# Patient Record
Sex: FEMALE | Race: WHITE | Employment: FULL TIME | URBAN - METROPOLITAN AREA
[De-identification: names, ages, dates, MRNs, and addresses within clinical notes are randomized per-mention and may not be internally consistent; named-entity substitution may affect disease eponyms.]

---

## 2019-04-30 ENCOUNTER — OFFICE VISIT (OUTPATIENT)
Dept: FAMILY MEDICINE CLINIC | Age: 29
End: 2019-04-30

## 2019-04-30 VITALS
RESPIRATION RATE: 16 BRPM | WEIGHT: 142.8 LBS | HEART RATE: 88 BPM | OXYGEN SATURATION: 99 % | SYSTOLIC BLOOD PRESSURE: 126 MMHG | BODY MASS INDEX: 26.28 KG/M2 | DIASTOLIC BLOOD PRESSURE: 80 MMHG | TEMPERATURE: 98.4 F | HEIGHT: 62 IN

## 2019-04-30 DIAGNOSIS — F41.9 ANXIETY: ICD-10-CM

## 2019-04-30 DIAGNOSIS — R53.83 FATIGUE, UNSPECIFIED TYPE: ICD-10-CM

## 2019-04-30 DIAGNOSIS — E66.3 OVERWEIGHT (BMI 25.0-29.9): ICD-10-CM

## 2019-04-30 DIAGNOSIS — F33.2 SEVERE EPISODE OF RECURRENT MAJOR DEPRESSIVE DISORDER, WITHOUT PSYCHOTIC FEATURES (HCC): Primary | ICD-10-CM

## 2019-04-30 DIAGNOSIS — G47.00 INSOMNIA, UNSPECIFIED TYPE: ICD-10-CM

## 2019-04-30 RX ORDER — BUPROPION HYDROCHLORIDE 75 MG/1
TABLET ORAL
Qty: 60 TAB | Refills: 1 | Status: SHIPPED | OUTPATIENT
Start: 2019-04-30 | End: 2019-05-13 | Stop reason: DRUGHIGH

## 2019-04-30 RX ORDER — MELATONIN 10 MG
CAPSULE ORAL
COMMUNITY

## 2019-04-30 RX ORDER — MELATONIN 5 MG
5 CAPSULE ORAL
COMMUNITY
End: 2019-04-30

## 2019-04-30 NOTE — PATIENT INSTRUCTIONS
FOR DEPRESSION AND ANXIETY: 
START TAKING WELLBUTRIN 75 MG TWICE A DAY, LAST DOSE BEFORE 2PM, IF NO HELP WITH DEPRESSION, LOW ENERGY, AND CONCENTRATION AFTER 1 WEEK CAN INCREASE TO 2 TABS (150 MG) TWICE A DAY 
TRY TO EAT THREE HEALTHY MEALS A DAY 
TRY TO EXERCISE 30 MINUTES A DAY PER BELOW 
TRY TO INCREASE WATER TO 2 LITERS PER DAY We are sending a referral to [address] . You should be called about this in 3-4 weeks. If no word in 4 weeks please give us a call to follow up GET BLOOD WORK FROM [facility] OR [facility] RETURN IN 3 WEEKS If you are having thoughts of harming yourself or others please report to local hospital for evaluation or call suicide hotline [phone] FOR WEIGHT: 
Continue to work on low sugar/carbohydrate diet for weight loss Exercise 30 minutes a day 4-5 days week Learning About Low-Carbohydrate Diets for Weight Loss What is a low-carbohydrate diet? Low-carb diets avoid foods that are high in carbohydrate. These high-carb foods include pasta, bread, rice, cereal, fruits, and starchy vegetables. Instead, these diets usually have you eat foods that are high in fat and protein. Many people lose weight quickly on a low-carb diet. But the early weight loss is water. People on this diet often gain the weight back after they start eating carbs again. Not all diet plans are safe or work well. A lot of the evidence shows that low-carb diets aren't healthy. That's because these diets often don't include healthy foods like fruits and vegetables. Losing weight safely means balancing protein, fat, and carbs with every meal and snack. And low-carb diets don't always provide the vitamins, minerals, and fiber you need. If you have a serious medical condition, talk to your doctor before you try any diet. These conditions include kidney disease, heart disease, type 2 diabetes, high cholesterol, and high blood pressure. If you are pregnant, it may not be safe for your baby if you are on a low-carb diet. How can you lose weight safely? You might have heard that a diet plan helped another person lose weight. But that doesn't mean that it will work for you. It is very hard to stay on a diet that includes lots of big changes in your eating habits. If you want to get to a healthy weight and stay there, making healthy lifestyle changes will often work better than dieting. These steps can help. · Make a plan for change. Work with your doctor to create a plan that is right for you. · See a dietitian. He or she can show you how to make healthy changes in your eating habits. · Manage stress. If you have a lot of stress in your life, it can be hard to focus on making healthy changes to your daily habits. · Track your food and activity. You are likely to do better at losing weight if you keep track of what you eat and what you do. Follow-up care is a key part of your treatment and safety. Be sure to make and go to all appointments, and call your doctor if you are having problems. It's also a good idea to know your test results and keep a list of the medicines you take. Where can you learn more? Go to http://litzy-jose.info/. Enter A121 in the search box to learn more about \"Learning About Low-Carbohydrate Diets for Weight Loss. \" Current as of: December 8, 2016 Content Version: 11.3 © 5954-4925 [employer]. Care instructions adapted under license by [employer] (which disclaims liability or warranty for this information). If you have questions about a medical condition or this instruction, always ask your healthcare professional. [address] any warranty or liability for your use of this information. Body Mass Index: Care Instructions Your Care Instructions Body mass index (BMI) can help you see if your weight is raising your risk for health problems. It uses a formula to compare how much you weigh with how tall you are. · A BMI lower than 18.5 is considered underweight. · A BMI between 18.5 and 24.9 is considered healthy. · A BMI between 25 and 29.9 is considered overweight. A BMI of 30 or higher is considered obese. If your BMI is in the normal range, it means that you have a lower risk for weight-related health problems. If your BMI is in the overweight or obese range, you may be at increased risk for weight-related health problems, such as high blood pressure, heart disease, stroke, arthritis or joint pain, and diabetes. If your BMI is in the underweight range, you may be at increased risk for health problems such as fatigue, lower protection (immunity) against illness, muscle loss, bone loss, hair loss, and hormone problems. BMI is just one measure of your risk for weight-related health problems. You may be at higher risk for health problems if you are not active, you eat an unhealthy diet, or you drink too much alcohol or use tobacco products. Follow-up care is a key part of your treatment and safety. Be sure to make and go to all appointments, and call your doctor if you are having problems. It's also a good idea to know your test results and keep a list of the medicines you take. How can you care for yourself at home? · Practice healthy eating habits. This includes eating plenty of fruits, vegetables, whole grains, lean protein, and low-fat dairy. · If your doctor recommends it, get more exercise. Walking is a good choice. Bit by bit, increase the amount you walk every day. Try for at least 30 minutes on most days of the week. · Do not smoke. Smoking can increase your risk for health problems. If you need help quitting, talk to your doctor about stop-smoking programs and medicines. These can increase your chances of quitting for good. · Limit alcohol to 2 drinks a day for men and 1 drink a day for women.  Too much alcohol can cause health problems. If you have a BMI higher than 25 · Your doctor may do other tests to check your risk for weight-related health problems. This may include measuring the distance around your waist. A waist measurement of more than 40 inches in men or 35 inches in women can increase the risk of weight-related health problems. · Talk with your doctor about steps you can take to stay healthy or improve your health. You may need to make lifestyle changes to lose weight and stay healthy, such as changing your diet and getting regular exercise. If you have a BMI lower than 18.5 · Your doctor may do other tests to check your risk for health problems. · Talk with your doctor about steps you can take to stay healthy or improve your health. You may need to make lifestyle changes to gain or maintain weight and stay healthy, such as getting more healthy foods in your diet and doing exercises to build muscle. Where can you learn more? Go to http://litzy-jose.info/. Enter S176 in the search box to learn more about \"Body Mass Index: Care Instructions. \" Current as of: October 13, 2016 Content Version: 11.4 © 7374-4820 [employer]. Care instructions adapted under license by [employer] (which disclaims liability or warranty for this information). If you have questions about a medical condition or this instruction, always ask your healthcare professional. [address] any warranty or liability for your use of this information.

## 2019-04-30 NOTE — PROGRESS NOTES
INTERNAL MEDICINE INITIAL PROVIDER VISIT SUBJECTIVE:  
 
Chief Complaint Patient presents with Dara Velazco Establish Care  Depression  Anxiety HPI: 34 y.o. female with PMHx significant for allergies is here for the above chief complaint(s). Establish Care: last PCP in years. Depression: since teenager, no formal diagnosis. Daughter born 5 years ago and onset of anxiety, mostly related to daughter and fear of something bad happening to her, which has improved over time. Loss of interest which has progressively worsened. For past 2 weeks reports nearly daily overeating, anhedonia, trouble falling asleep and staying asleep with melatonin helps stay asleep, low energy, trouble concentrating and feeling anxious and irritability. Increased irritability and low frustration tolerance. No episode of decreased need for sleep with increased activity. No AVH//PD/SI/HI. Exercise 2-3 x per week. Eating 3 meals and snacking in between. Positive h/o suicidal thoughts 5 years ago, no h/o suicide attempts. Positive guns at home. No recent panic attacks. Support system includes father of child and grandmother. Insomnia: Denies history of snoring, PND, orthopnea, AM headaches, witnessed apneic episodes. Some caffiene intake 1 cup in morning, has decreased TV/Phone/Reading in bed. STOP-BANG: - snoring, + day time fatigue, - observied apneic episodes, - HTN, - BMI >35, - AGE >50, - neck size >17 in male >15 in female, - male gender. Discussed writer leaving practice July 2019. ROS: 12 point complete ROS completed positive per HPI. Current Outpatient Medications Medication Sig  
 melatonin 10 mg cap Take  by mouth. No current facility-administered medications for this visit. Health Maintenance Topic Date Due  
 DTaP/Tdap/Td series (1 - Tdap) 02/26/2013  PAP AKA CERVICAL CYTOLOGY  02/26/2013  Influenza Age 5 to Adult  08/01/2019  Pneumococcal 0-64 years  Aged Out  
 
 
 Medications and Allergies: Reviewed and confirmed in the chart Past Medical Hx: Reviewed and confirmed in the chart Past Medical History:  
Diagnosis Date  Environmental and seasonal allergies  Overweight (BMI 25.0-29. 9) Patient Active Problem List  
Diagnosis Code  Overweight (BMI 25.0-29. 9) E66.3  Environmental and seasonal allergies J30.89  Severe episode of recurrent major depressive disorder, without psychotic features ([facility] [address] F33.2  Anxiety F41.9  Insomnia G47.00 Family Hx, Surgical Hx, Social Hx: Reviewed and updated in EMR OBJECTIVE: 
Vitals:  
 04/30/19 1411 BP: 126/80 Pulse: 88 Resp: 16 Temp: 98.4 °F (36.9 °C) TempSrc: Oral  
SpO2: 99% Weight: 142 lb 12.8 oz (64.8 kg) Height: 5' 2\" (1.575 m) BP Readings from Last 3 Encounters:  
04/30/19 126/80 Wt Readings from Last 3 Encounters:  
04/30/19 142 lb 12.8 oz (64.8 kg) General: Pleasant adult woman in no acute distress HEENT: Head is atraumatic normo-cephalic. Pupils equally round and reactive to light, extraocular muscle intact, conjunctiva clear, non-icterus. External ears and nose wnl. Oral mucosa moist without erythema, ulceration. Dentures upper teeth. Neck: Supple, no LAD, no palpable thyromegaly. CVS:  Heart regular, rate, and rhythm. Audible S1 and S2. No murmurs, rubs or gallops. PULM: Lungs clear to auscultation bilaterally. No wheezes, rales or rhonchi. GI: Positive bowel sounds, soft, nondistended, non-tender. EXT: 2+ dorsalis pedis pulses bilaterally. No pedal edema bilaterally Neuro: Alert and oriented x3. Gait wnl Mental Status Evaluation:  
 
Appearance Young adult woman dressed casually, good GH, good EC Attitude Cooperative, engaged Behavior:  No PMA/R Speech:  Normal spontaneous Mood:  Euthymic with underlying dysphoria Affect:  Congruent and reactive Thought Process:  Linear, goal directed Thought Content:  As per HPI Sensorium:  alert Cognition:  Grossly intact Insight:  fair Judgment: fair Impulse Control:  fair 4/30/2019 PHQ-9: 20, very difficult 4/30/2019 ALVAREZ-7: 15, very difficult Nursing Notes Reviewed ASSESSMENT AND PLAN 
  ICD-10-CM ICD-9-CM 1. Severe episode of recurrent major depressive disorder, without psychotic features ([facility] [address] F33.2 296.33 buPROPion (WELLBUTRIN) 75 mg tablet TSH 3RD GENERATION  
   T4, FREE  
   VITAMIN B12  
   CBC WITH AUTOMATED DIFF  
   REFERRAL TO PSYCHOLOGY HEALTHY DIET AND EXERCISE ENCOURAGED INCREASE WATER INTAKE 
DISCUSSED R/B/SE OF MEDICINE INCLUDING SEIZURE RISK, PT WITH NO H/O SEIZURE CONTINUE USE OF SOCIAL SUPPORTS 
RTC IN 3 WEEKS 2. Overweight (BMI 25.0-29. 9) E66.3 278.02 Diet and exercise plan discussed BMI education provided 3. Anxiety F41.9 300.00 REFERRAL TO PSYCHOLOGY 4. Fatigue, unspecified type R53.83 780.79 VITAMIN B12  
   CBC WITH AUTOMATED DIFF  
   METABOLIC PANEL, COMPREHENSIVE  
   VITAMIN D, 25 HYDROXY 5. Insomnia, unspecified type G47.00 780.52 SLEEP HYGIENE DISCUSSED CONTINUE MELATONIN Orders Placed This Encounter  TSH 3RD GENERATION  
 T4, FREE  
 VITAMIN B12  
 CBC WITH AUTOMATED DIFF  
 METABOLIC PANEL, COMPREHENSIVE  VITAMIN D, 25 HYDROXY  REFERRAL TO PSYCHOLOGY  DISCONTD: melatonin 5 mg cap capsule  melatonin 10 mg cap  buPROPion (WELLBUTRIN) 75 mg tablet No future appointments. AVS printed and provided to patient Assessment and plan above discussed with patient, patient voiced understanding and agreement with plan. More than 50% of this 60 MIN visit was spent face to face counseling the patient about the etiology and treatment options for the above health conditions outlined in assessment and plan Meredith Frank M.D. [address], [address] [phone] Fax - [phone] or [phone]

## 2019-04-30 NOTE — PROGRESS NOTES
Noemi Caballero is a 34 y.o. female presents in office for Chief Complaint Patient presents with [facility]  Depression  Anxiety Health Maintenance Due Topic Date Due  
 DTaP/Tdap/Td series (1 - Tdap) 02/26/2013  PAP AKA CERVICAL CYTOLOGY  02/26/2013 1. Have you been to the ER, urgent care clinic since your last visit? Hospitalized since your last visit? No 
 
2. Have you seen or consulted any other health care providers outside of the [address] [place] [address] since your last visit? Include any pap smears or colon screening. [facility] in 2013  
 
3 most recent [facility] Screens 4/30/2019 Little interest or pleasure in doing things Nearly every day Feeling down, depressed, irritable, or hopeless Several days Total Score PHQ 2 4 Trouble falling or staying asleep, or sleeping too much Nearly every day Feeling tired or having little energy More than half the days Poor appetite, weight loss, or overeating More than half the days Feeling bad about yourself - or that you are a failure or have let yourself or your family down More than half the days Trouble concentrating on things such as school, work, reading, or watching TV Nearly every day Moving or speaking so slowly that other people could have noticed; or the opposite being so fidgety that others notice Not at all Thoughts of being better off dead, or hurting yourself in some way Not at all PHQ 9 Score 16 How difficult have these problems made it for you to do your work, take care of your home and get along with others Very difficult Abuse Screening Questionnaire 4/30/2019 Do you ever feel afraid of your partner? Atul Walker Are you in a relationship with someone who physically or mentally threatens you? Atul Walker Is it safe for you to go home? Chrissy Whitman Fall Risk Assessment, last 12 mths 4/30/2019 Able to walk? Yes Fall in past 12 months? No  
 
Learning Assessment 4/30/2019 PRIMARY LEARNER Patient HIGHEST LEVEL OF EDUCATION - PRIMARY LEARNER  GRADUATED HIGH SCHOOL OR GED  
BARRIERS PRIMARY LEARNER NONE  
CO-LEARNER CAREGIVER No  
PRIMARY LANGUAGE ENGLISH  
LEARNER PREFERENCE PRIMARY DEMONSTRATION  
  READING  
ANSWERED BY patient RELATIONSHIP SELF

## 2019-05-02 ENCOUNTER — TELEPHONE (OUTPATIENT)
Dept: FAMILY MEDICINE CLINIC | Age: 29
End: 2019-05-02

## 2019-05-02 NOTE — TELEPHONE ENCOUNTER
Pt states was rx'd Wellbutrin a couple of days ago.  Pt inquiring can she take this medication with allergy medicaton

## 2019-05-02 NOTE — TELEPHONE ENCOUNTER
LPN please call patient and inform okay to take medicine with general allergy medicine. Patient can ask pharmacist at store where she purchases the allergy medicine if there are any concerns for the specific medicine she plans to purchase. Campbell Merritt M.D.   [place] [phone]  [facility]   [phone]

## 2019-05-03 LAB
25(OH)D3+25(OH)D2 SERPL-MCNC: 28.9 NG/ML (ref 30–100)
ALBUMIN SERPL-MCNC: 4.6 G/DL (ref 3.5–5.5)
ALBUMIN/GLOB SERPL: 1.9 {RATIO} (ref 1.2–2.2)
ALP SERPL-CCNC: 32 IU/L (ref 39–117)
ALT SERPL-CCNC: 16 IU/L (ref 0–32)
AST SERPL-CCNC: 14 IU/L (ref 0–40)
BASOPHILS # BLD AUTO: 0 X10E3/UL (ref 0–0.2)
BASOPHILS NFR BLD AUTO: 0 %
BILIRUB SERPL-MCNC: 0.5 MG/DL (ref 0–1.2)
BUN SERPL-MCNC: 17 MG/DL (ref 6–20)
BUN/CREAT SERPL: 25 (ref 9–23)
CALCIUM SERPL-MCNC: 9.4 MG/DL (ref 8.7–10.2)
CHLORIDE SERPL-SCNC: 101 MMOL/L (ref 96–106)
CO2 SERPL-SCNC: 24 MMOL/L (ref 20–29)
CREAT SERPL-MCNC: 0.69 MG/DL (ref 0.57–1)
EOSINOPHIL # BLD AUTO: 0.1 X10E3/UL (ref 0–0.4)
EOSINOPHIL NFR BLD AUTO: 1 %
ERYTHROCYTE [DISTWIDTH] IN BLOOD BY AUTOMATED COUNT: 13.6 % (ref 12.3–15.4)
GLOBULIN SER CALC-MCNC: 2.4 G/DL (ref 1.5–4.5)
GLUCOSE SERPL-MCNC: 89 MG/DL (ref 65–99)
HCT VFR BLD AUTO: 40.4 % (ref 34–46.6)
HGB BLD-MCNC: 13.1 G/DL (ref 11.1–15.9)
IMM GRANULOCYTES # BLD AUTO: 0 X10E3/UL (ref 0–0.1)
IMM GRANULOCYTES NFR BLD AUTO: 0 %
LYMPHOCYTES # BLD AUTO: 1.7 X10E3/UL (ref 0.7–3.1)
LYMPHOCYTES NFR BLD AUTO: 23 %
MCH RBC QN AUTO: 28.9 PG (ref 26.6–33)
MCHC RBC AUTO-ENTMCNC: 32.4 G/DL (ref 31.5–35.7)
MCV RBC AUTO: 89 FL (ref 79–97)
MONOCYTES # BLD AUTO: 0.6 X10E3/UL (ref 0.1–0.9)
MONOCYTES NFR BLD AUTO: 8 %
NEUTROPHILS # BLD AUTO: 4.8 X10E3/UL (ref 1.4–7)
NEUTROPHILS NFR BLD AUTO: 68 %
PLATELET # BLD AUTO: 319 X10E3/UL (ref 150–379)
POTASSIUM SERPL-SCNC: 4.2 MMOL/L (ref 3.5–5.2)
PROT SERPL-MCNC: 7 G/DL (ref 6–8.5)
RBC # BLD AUTO: 4.53 X10E6/UL (ref 3.77–5.28)
SODIUM SERPL-SCNC: 139 MMOL/L (ref 134–144)
T4 FREE SERPL-MCNC: 1.22 NG/DL (ref 0.82–1.77)
TSH SERPL DL<=0.005 MIU/L-ACNC: 1.2 UIU/ML (ref 0.45–4.5)
VIT B12 SERPL-MCNC: 586 PG/ML (ref 232–1245)
WBC # BLD AUTO: 7.2 X10E3/UL (ref 3.4–10.8)

## 2019-05-03 NOTE — TELEPHONE ENCOUNTER
Contacted patient in regards to taking OTC allergy medicine. Advised patient that general allergy medicine is ok to take and the pharmacist is available for further questions. Patient verbalized understanding.

## 2019-05-10 ENCOUNTER — TELEPHONE (OUTPATIENT)
Dept: FAMILY MEDICINE CLINIC | Age: 29
End: 2019-05-10

## 2019-05-10 DIAGNOSIS — F33.2 SEVERE EPISODE OF RECURRENT MAJOR DEPRESSIVE DISORDER, WITHOUT PSYCHOTIC FEATURES (HCC): Primary | ICD-10-CM

## 2019-05-10 NOTE — TELEPHONE ENCOUNTER
Patient called today stating that the provider told her that if the medication was not working to double the dose of the Wellbutrin. Patient states that she doubled the dose and it has made her feel really weird. She would like to know if there was a dose that is in between so she is not taking 150 mg of the medication.

## 2019-05-13 RX ORDER — BUPROPION HYDROCHLORIDE 100 MG/1
100 TABLET, EXTENDED RELEASE ORAL 2 TIMES DAILY
Qty: 60 TAB | Refills: 2 | Status: SHIPPED | OUTPATIENT
Start: 2019-05-13 | End: 2019-05-21 | Stop reason: SDUPTHER

## 2019-05-13 NOTE — TELEPHONE ENCOUNTER
I spoke with patient, she stated she felt \"confused when she doubled up on her wellbutrin\" She said she felt \"lost\" would like to know if there is a dosage in between.

## 2019-05-13 NOTE — TELEPHONE ENCOUNTER
LPN please call patient and inform can take 100 mg of wellbutrin twice a day, Rx sent to pharmacy on file. Maty Abreu M.D.   [address], [address] [place] [address], [address] [place] [address]   VJU -   [phone]

## 2019-05-21 ENCOUNTER — OFFICE VISIT (OUTPATIENT)
Dept: FAMILY MEDICINE CLINIC | Age: 29
End: 2019-05-21

## 2019-05-21 VITALS
SYSTOLIC BLOOD PRESSURE: 124 MMHG | WEIGHT: 140.4 LBS | HEART RATE: 89 BPM | TEMPERATURE: 96.7 F | HEIGHT: 62 IN | RESPIRATION RATE: 16 BRPM | DIASTOLIC BLOOD PRESSURE: 87 MMHG | BODY MASS INDEX: 25.83 KG/M2

## 2019-05-21 DIAGNOSIS — E55.9 VITAMIN D INSUFFICIENCY: ICD-10-CM

## 2019-05-21 DIAGNOSIS — F33.0 MDD (MAJOR DEPRESSIVE DISORDER), RECURRENT EPISODE, MILD (HCC): Primary | ICD-10-CM

## 2019-05-21 DIAGNOSIS — F41.9 ANXIETY: ICD-10-CM

## 2019-05-21 PROBLEM — F33.2 SEVERE EPISODE OF RECURRENT MAJOR DEPRESSIVE DISORDER, WITHOUT PSYCHOTIC FEATURES (HCC): Status: RESOLVED | Noted: 2019-04-30 | Resolved: 2019-05-21

## 2019-05-21 RX ORDER — BUPROPION HYDROCHLORIDE 100 MG/1
100 TABLET, EXTENDED RELEASE ORAL 2 TIMES DAILY
Qty: 60 TAB | Refills: 5 | Status: SHIPPED | OUTPATIENT
Start: 2019-05-21 | End: 2019-08-21 | Stop reason: SDUPTHER

## 2019-05-21 RX ORDER — ETONOGESTREL AND ETHINYL ESTRADIOL 11.7; 2.7 MG/1; MG/1
INSERT, EXTENDED RELEASE VAGINAL
COMMUNITY

## 2019-05-21 NOTE — PROGRESS NOTES
Internal Medicine Follow Up Visit Note    Chief Complaint   Patient presents with    Depression       HPI:  Whit Mooney is a 34 y.o.  female with history significant for anxiety and depression is here for the above complaint(s). Anxiety and depression: Since last visit mood is \"better. \" Has noticed an improvement \"in general.\" Taking wellbutrin 100 mg BID. Feels medicine is helpful. Reports some headaches intermittently, possibly related to allergies and when taking medicine without eating. Initially was irritable but improved. Eating 3 meals per day, not as much. Appetite and sleep are \"okay. \"  Switched to melatonin which has led to waking up earlier and trouble falling back to sleep. Feels sleep is restful. Exercise includes 3-4 x per week 35 minutes per sessions. Started talk therapy last week, going once a week. Discussed writer leaving practice July 2019. Current Outpatient Medications   Medication Sig    ethinyl estradiol-etonogestrel (NUVARING) 0.12-0.015 mg/24 hr vaginal ring by Intravaginal route.  buPROPion SR (WELLBUTRIN SR) 100 mg SR tablet Take 1 Tab by mouth two (2) times a day for 180 days.  melatonin 10 mg cap Take  by mouth. No current facility-administered medications for this visit. Health Maintenance   Topic Date Due    DTaP/Tdap/Td series (1 - Tdap) 02/26/2013    Influenza Age 5 to Adult  08/01/2019    PAP AKA CERVICAL CYTOLOGY  05/06/2022    Pneumococcal 0-64 years  Aged Out       There is no immunization history on file for this patient. Allergies and Medications: Reviewed and updated in EMR. Patient Active Problem List   Diagnosis Code    Overweight (BMI 25.0-29. 9) E66.3    Environmental and seasonal allergies J30.89    Anxiety F41.9    Insomnia G47.00    Vitamin D insufficiency E55.9    MDD (major depressive disorder), recurrent episode, mild (HCC) F33.0       Family History, Social History, Past Medical History, Surgical History: Reviewed and updated in EMR as appropriate. OBJECTIVE:   Visit Vitals  /87   Pulse 89   Temp 96.7 °F (35.9 °C) (Oral)   Resp 16   Ht 5' 2\" (1.575 m)   Wt 140 lb 6.4 oz (63.7 kg)   LMP 05/05/2019 (Approximate)   BMI 25.68 kg/m²        BP Readings from Last 3 Encounters:   05/21/19 124/87   04/30/19 126/80     Wt Readings from Last 3 Encounters:   05/21/19 140 lb 6.4 oz (63.7 kg)   04/30/19 142 lb 12.8 oz (64.8 kg)       General: Pleasant adult woman in no acute distress  HEENT: Head is atraumatic normo-cephalic. Neuro: Alert and oriented x3. Gait wnl     Mental Status Evaluation:      Appearance Young adult woman dressed casually, good GH, good EC   Attitude Cooperative, engaged   Behavior:  No PMA/R   Speech:  Normal spontaneous   Mood:  Euthymic   Affect:  Congruent and reactive   Thought Process:  Linear, goal directed   Thought Content:  As per HPI   Sensorium:  alert   Cognition:  Grossly intact   Insight:  fair   Judgment: good                                  Impulse Control:  good      4/30/2019 PHQ-9: 20, very difficult  4/30/2019 ALVAREZ-7: 15, very difficult    5/21/2019: PHQ-9: 6, not difficult at all  5/21/2019: ALVAREZ-7: 7, not difficult at all    Nursing Notes Reviewed. LABS/RADIOLOGICAL TESTS:    Lab Results   Component Value Date/Time    WBC 7.2 05/02/2019 09:26 AM    HGB 13.1 05/02/2019 09:26 AM    HCT 40.4 05/02/2019 09:26 AM    PLATELET 110 15/58/6110 09:26 AM     Lab Results   Component Value Date/Time    Sodium 139 05/02/2019 09:26 AM    Potassium 4.2 05/02/2019 09:26 AM    Chloride 101 05/02/2019 09:26 AM    CO2 24 05/02/2019 09:26 AM    Glucose 89 05/02/2019 09:26 AM    BUN 17 05/02/2019 09:26 AM    Creatinine 0.69 05/02/2019 09:26 AM       All lab results and radiological studies were reviewed and discussed with the patient. ASSESSMENT/PLAN:      ICD-10-CM ICD-9-CM    1.  MDD (major depressive disorder), recurrent episode, mild (HCC) F33.0 296.31 buPROPion SR (WELLBUTRIN SR) 100 mg SR tablet bid  Continue therapy  Diet, exercise, self care discussed  RTC in 3 months establish with new PCP   2. Anxiety F41.9 300.00 buPROPion SR (WELLBUTRIN SR) 100 mg SR tablet   3. Vitamin D insufficiency E55.9 268.9 [id number] units daily OTC       Requested Prescriptions     Signed Prescriptions Disp Refills    buPROPion SR (WELLBUTRIN SR) 100 mg SR tablet 60 Tab 5     Sig: Take 1 Tab by mouth two (2) times a day for 180 days. Patient verbalized understanding and agreement with the plan. Patient was given an after-visit summary. Follow-up and Dispositions    · Return in about 3 months (around 8/21/2019) for anxiety and depression. or sooner if worsening symptoms. More than 50% of this 25 min visit was spent counseling the patient face to face about etiology and treatment of health conditions outlined in assessment and plan. Amberly Welch M.D.   [place] [id number] [place] [address]   [phone]

## 2019-05-21 NOTE — PROGRESS NOTES
Chief Complaint   Patient presents with    Depression     1. Have you been to the ER, urgent care clinic since your last visit? Hospitalized since your last visit? No    2. Have you seen or consulted any other health care providers outside of the [address] [place] [address] since your last visit? Include any pap smears or colon screening.  GYN-Dr Jenny Dennis

## 2019-05-21 NOTE — PATIENT INSTRUCTIONS
START TAKING VITAMIN D [id number] UNITS DAILY Recovering From Depression: Care Instructions Your Care Instructions Taking good care of yourself is important as you recover from depression. In time, your symptoms will fade as your treatment takes hold. Do not give up. Instead, focus your energy on getting better. Your mood will improve. It just takes some time. Focus on things that can help you feel better, such as being with friends and family, eating well, and getting enough rest. But take things slowly. Do not do too much too soon. You will begin to feel better gradually. Follow-up care is a key part of your treatment and safety. Be sure to make and go to all appointments, and call your doctor if you are having problems. It's also a good idea to know your test results and keep a list of the medicines you take. How can you care for yourself at home? Be realistic · If you have a large task to do, break it up into smaller steps you can handle, and just do what you can. · You may want to put off important decisions until your depression has lifted. If you have plans that will have a major impact on your life, such as marriage, divorce, or a job change, try to wait a bit. Talk it over with friends and loved ones who can help you look at the overall picture first. 
· Reaching out to people for help is important. Do not isolate yourself. Let your family and friends help you. Find someone you can trust and confide in, and talk to that person. · Be patient, and be kind to yourself. Remember that depression is not your fault and is not something you can overcome with willpower alone. Treatment is necessary for depression, just like for any other illness. Feeling better takes time, and your mood will improve little by little. Stay active · Stay busy and get outside. Take a walk, or try some other light exercise. · Talk with your doctor about an exercise program. Exercise can help with mild depression. · Go to a movie or concert. Take part in a [employer]an activity or other social gathering. Go to a ball game. · Ask a friend to have dinner with you. Take care of yourself · Eat a balanced diet with plenty of fresh fruits and vegetables, whole grains, and lean protein. If you have lost your appetite, eat small snacks rather than large meals. · Avoid drinking alcohol or using illegal drugs. Do not take medicines that have not been prescribed for you. They may interfere with medicines you may be taking for depression, or they may make your depression worse. · Take your medicines exactly as they are prescribed. You may start to feel better within 1 to 3 weeks of taking antidepressant medicine. But it can take as many as 6 to 8 weeks to see more improvement. If you have questions or concerns about your medicines, or if you do not notice any improvement by 3 weeks, talk to your doctor. · If you have any side effects from your medicine, tell your doctor. Antidepressants can make you feel tired, dizzy, or nervous. Some people have dry mouth, constipation, headaches, sexual problems, or diarrhea. Many of these side effects are mild and will go away on their own after you have been taking the medicine for a few weeks. Some may last longer. Talk to your doctor if side effects are bothering you too much. You might be able to try a different medicine. · Get enough sleep. If you have problems sleeping: 
? Go to bed at the same time every night, and get up at the same time every morning. ? Keep your bedroom dark and quiet. ? Do not exercise after 5:00 p.m. ? Avoid drinks with caffeine after 5:00 p.m. · Avoid sleeping pills unless they are prescribed by the doctor treating your depression. Sleeping pills may make you groggy during the day, and they may interact with other medicine you are taking.  
· If you have any other illnesses, such as diabetes, heart disease, or high blood pressure, make sure to continue with your treatment. Tell your doctor about all of the medicines you take, including those with or without a prescription. · Keep the numbers for these national suicide hotlines: 1-380-793-TALK (0-414.743.4127) and 4-866-WAVZJHX (4-752.915.4718). If you or someone you know talks about suicide or feeling hopeless, get help right away. When should you call for help? Call 911 anytime you think you may need emergency care. For example, call if: 
  · You feel like hurting yourself or someone else.  
  · Someone you know has depression and is about to attempt or is attempting suicide.  
[facility] your doctor now or seek immediate medical care if: 
  · You hear voices.  
  · Someone you know has depression and: 
? Starts to give away his or her possessions. ? Uses illegal drugs or drinks alcohol heavily. ? Talks or writes about death, including writing suicide notes or talking about guns, knives, or pills. ? Starts to spend a lot of time alone. ? Acts very aggressively or suddenly appears calm.  
 Watch closely for changes in your health, and be sure to contact your doctor if: 
  · You do not get better as expected. Where can you learn more? Go to http://litzy-jose.info/. Enter D004 in the search box to learn more about \"Recovering From Depression: Care Instructions. \" Current as of: September 11, 2018 Content Version: 11.9 © 8633-7018 [employer]. Care instructions adapted under license by [employer] (which disclaims liability or warranty for this information). If you have questions about a medical condition or this instruction, always ask your healthcare professional. [place] [address] any warranty or liability for your use of this information.

## 2019-08-21 ENCOUNTER — OFFICE VISIT (OUTPATIENT)
Dept: FAMILY MEDICINE CLINIC | Age: 29
End: 2019-08-21

## 2019-08-21 VITALS
OXYGEN SATURATION: 100 % | SYSTOLIC BLOOD PRESSURE: 147 MMHG | TEMPERATURE: 97.1 F | RESPIRATION RATE: 16 BRPM | HEART RATE: 92 BPM | WEIGHT: 140 LBS | DIASTOLIC BLOOD PRESSURE: 97 MMHG | BODY MASS INDEX: 25.76 KG/M2 | HEIGHT: 62 IN

## 2019-08-21 DIAGNOSIS — F41.9 ANXIETY: ICD-10-CM

## 2019-08-21 DIAGNOSIS — F32.A DEPRESSION, UNSPECIFIED DEPRESSION TYPE: ICD-10-CM

## 2019-08-21 DIAGNOSIS — Z76.89 ENCOUNTER TO ESTABLISH CARE: Primary | ICD-10-CM

## 2019-08-21 DIAGNOSIS — R03.0 ELEVATED BLOOD PRESSURE READING: ICD-10-CM

## 2019-08-21 DIAGNOSIS — F33.0 MDD (MAJOR DEPRESSIVE DISORDER), RECURRENT EPISODE, MILD (HCC): ICD-10-CM

## 2019-08-21 RX ORDER — SERTRALINE HYDROCHLORIDE 25 MG/1
25 TABLET, FILM COATED ORAL DAILY
Qty: 30 TAB | Refills: 1 | Status: SHIPPED | OUTPATIENT
Start: 2019-08-21 | End: 2019-10-02 | Stop reason: DRUGHIGH

## 2019-08-21 NOTE — PROGRESS NOTES
ESTABLISH CARE VISIT    SUBJECTIVE:     Chief Complaint   Patient presents with    Establish Care    Depression       HPI: 34 y.o.  female  has a past medical history of Environmental and seasonal allergies and Overweight (BMI 25.0-29.9). is here for the above chief complaint(s). Depression and Anxiety Follow-up:   Patient is seen for depression and anxiety disorder. Current treatment includes Wellbutrin and no other therapies. Ongoing depressive symptoms include depressed mood. Ongoig anxiety symptoms include: none. Patient denies: continues to have ongoing depressed mood   Reported side effects from the treatment: none. BP Readings from Last 3 Encounters:   08/21/19 (!) 147/97   05/21/19 124/87   04/30/19 126/80       Review of Systems   Constitutional: Negative for chills, fever, malaise/fatigue and weight loss. Eyes: Negative for blurred vision, double vision and pain. Respiratory: Negative for cough, sputum production, shortness of breath and wheezing. Cardiovascular: Negative for chest pain, palpitations, orthopnea, claudication and leg swelling. Gastrointestinal: Negative for abdominal pain, constipation, diarrhea, nausea and vomiting. Genitourinary: Negative for dysuria, frequency and urgency. Neurological: Negative for dizziness, tingling and headaches. [unfilled]  Current Outpatient Medications   Medication Sig    ethinyl estradiol-etonogestrel (NUVARING) 0.12-0.015 mg/24 hr vaginal ring by Intravaginal route.  buPROPion SR (WELLBUTRIN SR) 100 mg SR tablet Take 1 Tab by mouth two (2) times a day for 180 days.  melatonin 10 mg cap Take  by mouth. No current facility-administered medications for this visit.       Health Maintenance   Topic Date Due    DTaP/Tdap/Td series (1 - Tdap) 02/26/2013    Influenza Age 5 to Adult  08/01/2019    PAP AKA CERVICAL CYTOLOGY  05/06/2022    Pneumococcal 0-64 years  Aged Out       Medications and Allergies: Reviewed and confirmed in the chart    Past Medical Hx: Reviewed and confirmed in the chart  Past Medical History:   Diagnosis Date    Environmental and seasonal allergies     Overweight (BMI 25.0-29. 9)        Patient Active Problem List   Diagnosis Code    Overweight (BMI 25.0-29. 9) E66.3    Environmental and seasonal allergies J30.89    Anxiety F41.9    Insomnia G47.00    Vitamin D insufficiency E55.9    MDD (major depressive disorder), recurrent episode, mild (HCC) F33.0       Family Hx, Surgical Hx, Social Hx: Reviewed and updated in EMR    OBJECTIVE:   Vitals:    08/21/19 1501   BP: (!) 147/97   Pulse: 92   Resp: 16   Temp: 97.1 °F (36.2 °C)   TempSrc: Oral   SpO2: 100%   Weight: 140 lb (63.5 kg)   Height: 5' 2\" (1.575 m)       BP Readings from Last 3 Encounters:   08/21/19 (!) 147/97   05/21/19 124/87   04/30/19 126/80     Wt Readings from Last 3 Encounters:   08/21/19 140 lb (63.5 kg)   05/21/19 140 lb 6.4 oz (63.7 kg)   04/30/19 142 lb 12.8 oz (64.8 kg)       Physical Exam   Constitutional: She is oriented to person, place, and time and well-developed, well-nourished, and in no distress. No distress. Neck: Normal range of motion. Neck supple. No thyromegaly present. Cardiovascular: Normal rate, regular rhythm, normal heart sounds and intact distal pulses. Exam reveals no gallop and no friction rub. No murmur heard. Pulmonary/Chest: Effort normal and breath sounds normal. No respiratory distress. She has no wheezes. She has no rales. She exhibits no tenderness. Lymphadenopathy:     She has no cervical adenopathy. Neurological: She is alert and oriented to person, place, and time. Skin: Skin is warm and dry. She is not diaphoretic. Psychiatric: Mood, memory, affect and judgment normal.   Nursing note and vitals reviewed.         Lab Results   Component Value Date/Time    WBC 7.2 05/02/2019 09:26 AM    HGB 13.1 05/02/2019 09:26 AM    HCT 40.4 05/02/2019 09:26 AM    PLATELET 091 97/03/1435 09:26 AM Lab Results   Component Value Date/Time    Sodium 139 05/02/2019 09:26 AM    Potassium 4.2 05/02/2019 09:26 AM    Chloride 101 05/02/2019 09:26 AM    CO2 24 05/02/2019 09:26 AM    Glucose 89 05/02/2019 09:26 AM    BUN 17 05/02/2019 09:26 AM    Creatinine 0.69 05/02/2019 09:26 AM     No results found for: CHOL, CHOLX, CHLST, CHOLV, HDL, LDL, LDLC, DLDLP, TGLX, TRIGL, TRIGP  No results found for: GPT    Nursing Notes Reviewed    ASSESSMENT AND PLAN  Diagnoses and all orders for this visit:    1. Depression, unspecified depression type  -    Continue with wellbutrin SR 100MG bid  start sertraline (ZOLOFT) 25 mg tablet; Take 1 Tab by mouth daily. 2. Elevated blood pressure  Discussed with patient that elevated blood pressure could be related to OCP and/or wellbutrin. Will continue to monitor. She is going to discuss with her GYN about switching medications. Monitor blood pressure at home. Report to clinic any systolic blood pressure >615 and/or diastolic blood pressure >608.     3. Establish Care  As above. .. I have discussed the diagnosis with the patient and the intended plan as seen in the above orders. The patient has received an after-visit summary and questions were answered concerning future plans. I have discussed medication side effects and warnings with the patient as well. I have reviewed the plan of care with the patient, accepted their input and they are in agreement with the treatment goals. More than 50% of this 30 min visit was spent counseling the patient face to face about etiology and treatment of health conditions outlined in assessment and plan        Cynthia Paige [address], [address] [place] [address] [phone]  [place] [phone]  [phone]

## 2019-08-21 NOTE — TELEPHONE ENCOUNTER
Pt stated she was just seen & forgot to request this refill. Please send to [facility], [place] [address]    Requested Prescriptions     Pending Prescriptions Disp Refills    buPROPion SR (WELLBUTRIN SR) 100 mg SR tablet 60 Tab 5     Sig: Take 1 Tab by mouth two (2) times a day for 180 days.

## 2019-08-21 NOTE — PROGRESS NOTES
Chief Complaint   Patient presents with    Establish Care    Depression     1. Have you been to the ER, urgent care clinic since your last visit? Hospitalized since your last visit? No    2. Have you seen or consulted any other health care providers outside of the [address] [place] [address] since your last visit? Include any pap smears or colon screening.  GYN, Dentist

## 2019-08-22 RX ORDER — BUPROPION HYDROCHLORIDE 100 MG/1
100 TABLET, EXTENDED RELEASE ORAL 2 TIMES DAILY
Qty: 60 TAB | Refills: 5 | Status: SHIPPED | OUTPATIENT
Start: 2019-08-22 | End: 2020-02-18

## 2019-10-02 ENCOUNTER — OFFICE VISIT (OUTPATIENT)
Dept: FAMILY MEDICINE CLINIC | Age: 29
End: 2019-10-02

## 2019-10-02 VITALS
DIASTOLIC BLOOD PRESSURE: 96 MMHG | WEIGHT: 138 LBS | TEMPERATURE: 98.4 F | SYSTOLIC BLOOD PRESSURE: 128 MMHG | RESPIRATION RATE: 18 BRPM | BODY MASS INDEX: 25.4 KG/M2 | OXYGEN SATURATION: 98 % | HEIGHT: 62 IN | HEART RATE: 88 BPM

## 2019-10-02 DIAGNOSIS — F41.9 ANXIETY: Primary | ICD-10-CM

## 2019-10-02 DIAGNOSIS — F33.0 MDD (MAJOR DEPRESSIVE DISORDER), RECURRENT EPISODE, MILD (HCC): ICD-10-CM

## 2019-10-02 RX ORDER — SERTRALINE HYDROCHLORIDE 50 MG/1
50 TABLET, FILM COATED ORAL DAILY
Qty: 30 TAB | Refills: 2 | Status: SHIPPED | OUTPATIENT
Start: 2019-10-02

## 2019-10-02 NOTE — PROGRESS NOTES
Chief Complaint   Patient presents with    Depression     f/u     . Parmjit Leach 1. Have you been to the ER, urgent care clinic since your last visit? Hospitalized since your last visit? No    2. Have you seen or consulted any other health care providers outside of the [address] [place] [address] since your last visit? Include any pap smears or colon screening.  No

## 2019-10-02 NOTE — PROGRESS NOTES
Follow Up Visit Note    Chief Complaint   Patient presents with    Depression     f/u       HPI:  Bryce Zurita is a 34 y.o.  female  has a past medical history of Environmental and seasonal allergies and Overweight (BMI 25.0-29.9). is here for the above complaint(s). Depression and Anxiety Follow-up:  Patient doing well on addition of zoloft to wellbutrin. She states that after 2 weeks of taking zoloft, she noticed an overall improvement in anxiety. She states that she feels this may have leveled off, though. Still feeling anxious about certain things in her life, overthinking, racing thoughts. She continues to do talk therapy and finds this very helpful. Patient states that she has noticed some fatigue since starting zoloft, but she also states that her daughter is back in school and she is getting up earlier. She denies any other side effects from medicatoin at this time. Patient is seen for depression and anxiety disorder. Current treatment includes Zoloft, Wellbutrin and individual therapy. Ongoing depressive symptoms include none. Ongoig anxiety symptoms include: racing thoughts. Patient denies:  side effects from the treatment: none. and  none. Reported side effects from the treatment: none. Triggers include: family, finances  Psychological ROS:   positive for - anxiety  negative for - all others negative    PHQ9 Score:  6  ALVAREZ 7 15      Elevated Blood Pressure  BP Readings from Last 3 Encounters:   10/02/19 (!) 138/92   08/21/19 (!) 147/97   05/21/19 124/87     Repeat 128/96  Patient BP elevated at last visit. She started nuvaring and noticed her bp started going up. She has never had elevated bp in the past, low risk. Review of Systems   Constitutional: Negative for chills, fever, malaise/fatigue and weight loss. Eyes: Negative for blurred vision, double vision and pain. Respiratory: Negative for cough, sputum production, shortness of breath and wheezing.     Cardiovascular: Negative for chest pain, palpitations, orthopnea, claudication and leg swelling. Gastrointestinal: Negative for abdominal pain, constipation, diarrhea, nausea and vomiting. Genitourinary: Negative for dysuria, frequency and urgency. Neurological: Negative for dizziness, tingling and headaches. Psychiatric/Behavioral: Negative for depression. The patient is nervous/anxious. Current Outpatient Medications   Medication Sig    buPROPion SR (WELLBUTRIN SR) 100 mg SR tablet Take 1 Tab by mouth two (2) times a day for 180 days.  sertraline (ZOLOFT) 25 mg tablet Take 1 Tab by mouth daily.  ethinyl estradiol-etonogestrel (NUVARING) 0.12-0.015 mg/24 hr vaginal ring by Intravaginal route.  melatonin 10 mg cap Take  by mouth. No current facility-administered medications for this visit. Health Maintenance   Topic Date Due    DTaP/Tdap/Td series (1 - Tdap) 02/26/2013    Influenza Age 5 to Adult  08/01/2019    PAP AKA CERVICAL CYTOLOGY  05/06/2022    Pneumococcal 0-64 years  Aged Out       There is no immunization history on file for this patient. Allergies and Medications: Reviewed and updated in EMR. Past Medical History:   Diagnosis Date    Environmental and seasonal allergies     Overweight (BMI 25.0-29. 9)        Surgical History: Reviewed and updated in EMR as appropriate. Social History: Reviewed and updated in EMR as appropriate. Family History: Reviewed and updated in EMR as appropriate. OBJECTIVE:   Visit Vitals  BP (!) 138/92   Pulse 88   Temp 98.4 °F (36.9 °C) (Oral)   Resp 18   Ht 5' 2\" (1.575 m)   Wt 138 lb (62.6 kg)   LMP 09/26/2019 (Approximate)   SpO2 98%   BMI 25.24 kg/m²        Physical Exam   Constitutional: She is oriented to person, place, and time and well-developed, well-nourished, and in no distress. No distress. Neck: Normal range of motion. Neck supple. No thyromegaly present.    Cardiovascular: Normal rate, regular rhythm, normal heart sounds and intact distal pulses. Exam reveals no gallop and no friction rub. No murmur heard. Pulmonary/Chest: Effort normal and breath sounds normal. No respiratory distress. She has no wheezes. She has no rales. She exhibits no tenderness. Lymphadenopathy:     She has no cervical adenopathy. Neurological: She is alert and oriented to person, place, and time. Skin: Skin is warm and dry. She is not diaphoretic. Psychiatric: Mood, memory, affect and judgment normal.   Vitals reviewed. LABS/RADIOLOGICAL TESTS:  Lab Results   Component Value Date/Time    WBC 7.2 05/02/2019 09:26 AM    HGB 13.1 05/02/2019 09:26 AM    HCT 40.4 05/02/2019 09:26 AM    PLATELET 419 73/15/7676 09:26 AM     Lab Results   Component Value Date/Time    Sodium 139 05/02/2019 09:26 AM    Potassium 4.2 05/02/2019 09:26 AM    Chloride 101 05/02/2019 09:26 AM    CO2 24 05/02/2019 09:26 AM    Glucose 89 05/02/2019 09:26 AM    BUN 17 05/02/2019 09:26 AM    Creatinine 0.69 05/02/2019 09:26 AM     No results found for: CHOL, CHOLX, CHLST, CHOLV, HDL, HDLP, LDL, LDLC, DLDLP, TGLX, TRIGL, TRIGP  No results found for: GPT  No results found for: HBA1C, HGBE8, [id number], [id number]      All lab results and radiological studies were reviewed and discussed with the patient. ASSESSMENT/PLAN:    Diagnoses and all orders for this visit:    1. Anxiety  -     sertraline (ZOLOFT) 50 mg tablet; Take 1 Tab by mouth daily. Continue with  therapy services     Increase zoloft to 50mg daily. I have given him a full month supply. Patient Education:  Reviewed need for daily exercise. Need for sun exposure at least 20mintues daily. Reviewed need for good social support, surrounding self with helpful and uplifting family and friends. Recommended daily meditation or reflection through prayer, prayer, [place] chi. Informed to call this clinic if he has any sort of suicidal or homicidal ideation. Reviewed suicide hotline.     Follow up here or with PCP earlier if dramatic worsening symptoms in the next 2 weeks. Make a follow up appt with PCP in 1 month. Patient voices understanding to the plan of care above. 2. MDD (major depressive disorder), recurrent episode, mild (HCC)  -     sertraline (ZOLOFT) 50 mg tablet; Take 1 Tab by mouth daily. 3. Elevated blood pressure  Discussed with patient that her BP is likely elevated 2 to nuvaring. She plans to talk with her gynecologist and discontinue this medication. Advised to monitor bp as well. RTC in 1 month for recheck. Patient verbalized understanding and agreement with the plan. Patient was given an after-visit summary. Follow-up in 6 weeks or sooner if worsening symptoms. More than 50% of this 25 min visit was spent counseling the patient face to face about etiology and treatment of health conditions outlined in assessment and plan        Cynthia Chaparro PA-C  [address], [address] [place] [address], [address] [place] [address] [phone]

## 2022-03-18 PROBLEM — G47.00 INSOMNIA: Status: ACTIVE | Noted: 2019-04-30

## 2022-03-19 PROBLEM — E55.9 VITAMIN D INSUFFICIENCY: Status: ACTIVE | Noted: 2019-05-21

## 2022-03-20 PROBLEM — F33.0 MDD (MAJOR DEPRESSIVE DISORDER), RECURRENT EPISODE, MILD (HCC): Status: ACTIVE | Noted: 2019-05-21

## 2022-03-20 PROBLEM — F41.9 ANXIETY: Status: ACTIVE | Noted: 2019-04-30

## 2023-01-31 RX ORDER — MELATONIN 10 MG
CAPSULE ORAL
COMMUNITY

## 2023-01-31 RX ORDER — ETONOGESTREL AND ETHINYL ESTRADIOL 11.7; 2.7 MG/1; MG/1
INSERT, EXTENDED RELEASE VAGINAL
COMMUNITY